# Patient Record
Sex: FEMALE | Race: WHITE | Employment: UNEMPLOYED | URBAN - METROPOLITAN AREA
[De-identification: names, ages, dates, MRNs, and addresses within clinical notes are randomized per-mention and may not be internally consistent; named-entity substitution may affect disease eponyms.]

---

## 2018-12-13 ENCOUNTER — OFFICE VISIT (OUTPATIENT)
Dept: OBGYN CLINIC | Age: 23
End: 2018-12-13

## 2018-12-13 VITALS
BODY MASS INDEX: 24.43 KG/M2 | WEIGHT: 152 LBS | HEART RATE: 74 BPM | RESPIRATION RATE: 16 BRPM | TEMPERATURE: 98.6 F | DIASTOLIC BLOOD PRESSURE: 84 MMHG | HEIGHT: 66 IN | SYSTOLIC BLOOD PRESSURE: 129 MMHG

## 2018-12-13 DIAGNOSIS — Z30.09 FAMILY PLANNING: Primary | ICD-10-CM

## 2018-12-13 RX ORDER — DROSPIRENONE AND ETHINYL ESTRADIOL 0.02-3(28)
KIT ORAL DAILY
COMMUNITY
End: 2018-12-13 | Stop reason: SDUPTHER

## 2018-12-13 RX ORDER — DROSPIRENONE AND ETHINYL ESTRADIOL 0.02-3(28)
1 KIT ORAL DAILY
Qty: 1 PACKAGE | Refills: 11 | Status: SHIPPED | OUTPATIENT
Start: 2018-12-13 | End: 2019-10-04 | Stop reason: SDUPTHER

## 2018-12-13 NOTE — PROGRESS NOTES
Subjective:   21 y.o. female for Well Woman Check. She only has complaints of painful urination intermittently after sex. She had a pap smear in 2017, which was normal.  Patient's last menstrual period was 12/01/2018 (exact date). Social History: single partner, contraception - OCP (Oral Contraceptive Pills). Pertinent past medical hstory: no history of HTN, DVT, CAD, DM, liver disease, migraines or smoking. There is no problem list on file for this patient. Current Outpatient Medications   Medication Sig Dispense Refill    drospirenone-ethinyl estradiol (TEOFILO, 28,) 3-0.02 mg tab Take 1 Tab by mouth daily. 1 Package 11     No Known Allergies  History reviewed. No pertinent past medical history. History reviewed. No pertinent surgical history. Family History   Problem Relation Age of Onset    Lung Disease Maternal Grandmother      Social History     Tobacco Use    Smoking status: Never Smoker    Smokeless tobacco: Never Used   Substance Use Topics    Alcohol use: Yes     Comment: socially        ROS:  Feeling well. No dyspnea or chest pain on exertion. No abdominal pain, change in bowel habits, black or bloody stools. No urinary tract symptoms. GYN ROS: normal menses, no abnormal bleeding, pelvic pain or discharge, no breast pain or new or enlarging lumps on self exam. No neurological complaints. Objective:     Visit Vitals  /84   Pulse 74   Temp 98.6 °F (37 °C) (Oral)   Resp 16   Ht 5' 6\" (1.676 m)   Wt 152 lb (68.9 kg)   LMP 12/01/2018 (Exact Date)   BMI 24.53 kg/m²     The patient appears well, alert, oriented x 3, in no distress. ENT normal.  Neck supple. No adenopathy or thyromegaly. RAH. Lungs are clear, good air entry, no wheezes, rhonchi or rales. S1 and S2 normal, no murmurs, regular rate and rhythm. Abdomen soft without tenderness, guarding, mass or organomegaly. Extremities show no edema, normal peripheral pulses. Neurological is normal, no focal findings.     BREAST EXAM: breasts appear normal, no suspicious masses, no skin or nipple changes or axillary nodes    PELVIC EXAM: normal external genitalia, vulva, vagina, cervix, uterus and adnexa    Assessment/Plan:   well woman  pap smear  counseled on breast self exam, STD prevention and use and side effects of OCP's  return annually or prn    ICD-10-CM ICD-9-CM    1. Family planning Z30.09 V25.09 AMB POC URINE PREGNANCY TEST, VISUAL COLOR COMPARISON     Pt. Declined STD testing. Refill sent for OCPs per patient preference. RTO 1 year.

## 2018-12-13 NOTE — PATIENT INSTRUCTIONS

## 2019-01-09 ENCOUNTER — TELEPHONE (OUTPATIENT)
Dept: OBGYN CLINIC | Age: 24
End: 2019-01-09

## 2019-01-09 NOTE — TELEPHONE ENCOUNTER
Gail Law called because she wanted to to skip her December period so she started a new pack of birth control , without taking the placebos . Currently she is having period symptoms and is bleeding brown blood . There is no red in her pads . She states she may have messed her cycle up trying to skip and wants to be advised on what to do now . Also she wants to know if there are any BC options that will allow her to have her period less frequently , possibly every 3 months. Please advise. Thank you .

## 2019-10-04 RX ORDER — DROSPIRENONE AND ETHINYL ESTRADIOL 0.02-3(28)
KIT ORAL
Qty: 28 TAB | Refills: 1 | Status: SHIPPED | OUTPATIENT
Start: 2019-10-04